# Patient Record
Sex: MALE | Race: WHITE | NOT HISPANIC OR LATINO | URBAN - METROPOLITAN AREA
[De-identification: names, ages, dates, MRNs, and addresses within clinical notes are randomized per-mention and may not be internally consistent; named-entity substitution may affect disease eponyms.]

---

## 2021-03-27 ENCOUNTER — IMMUNIZATION (OUTPATIENT)
Dept: NURSING | Facility: CLINIC | Age: 51
End: 2021-03-27
Payer: COMMERCIAL

## 2021-03-27 PROCEDURE — 91301 PR COVID VAC MODERNA 100 MCG/0.5 ML IM: CPT

## 2021-03-27 PROCEDURE — 0011A PR COVID VAC MODERNA 100 MCG/0.5 ML IM: CPT

## 2021-04-24 ENCOUNTER — IMMUNIZATION (OUTPATIENT)
Dept: NURSING | Facility: CLINIC | Age: 51
End: 2021-04-24
Attending: INTERNAL MEDICINE
Payer: COMMERCIAL

## 2021-04-24 PROCEDURE — 0012A PR COVID VAC MODERNA 100 MCG/0.5 ML IM: CPT

## 2021-04-24 PROCEDURE — 91301 PR COVID VAC MODERNA 100 MCG/0.5 ML IM: CPT

## 2021-05-01 ENCOUNTER — HEALTH MAINTENANCE LETTER (OUTPATIENT)
Age: 51
End: 2021-05-01

## 2021-10-11 ENCOUNTER — HEALTH MAINTENANCE LETTER (OUTPATIENT)
Age: 51
End: 2021-10-11

## 2021-11-12 ENCOUNTER — IMMUNIZATION (OUTPATIENT)
Dept: NURSING | Facility: CLINIC | Age: 51
End: 2021-11-12
Payer: COMMERCIAL

## 2021-11-12 PROCEDURE — 0064A COVID-19,PF,MODERNA (18+ YRS BOOSTER .25ML): CPT

## 2021-11-12 PROCEDURE — 91306 COVID-19,PF,MODERNA (18+ YRS BOOSTER .25ML): CPT

## 2022-05-22 ENCOUNTER — HEALTH MAINTENANCE LETTER (OUTPATIENT)
Age: 52
End: 2022-05-22

## 2022-09-25 ENCOUNTER — HEALTH MAINTENANCE LETTER (OUTPATIENT)
Age: 52
End: 2022-09-25

## 2023-05-26 ENCOUNTER — VIRTUAL VISIT (OUTPATIENT)
Dept: FAMILY MEDICINE | Facility: CLINIC | Age: 53
End: 2023-05-26
Payer: COMMERCIAL

## 2023-05-26 DIAGNOSIS — Z76.89 ENCOUNTER TO ESTABLISH CARE: ICD-10-CM

## 2023-05-26 DIAGNOSIS — J32.9 RECURRENT SINUSITIS: ICD-10-CM

## 2023-05-26 DIAGNOSIS — F33.1 MODERATE RECURRENT MAJOR DEPRESSION (H): Primary | ICD-10-CM

## 2023-05-26 PROCEDURE — 99203 OFFICE O/P NEW LOW 30 MIN: CPT | Mod: VID | Performed by: FAMILY MEDICINE

## 2023-05-26 RX ORDER — SERTRALINE HYDROCHLORIDE 25 MG/1
25 TABLET, FILM COATED ORAL DAILY
Qty: 90 TABLET | Refills: 3 | Status: SHIPPED | OUTPATIENT
Start: 2023-05-26 | End: 2024-03-25

## 2023-05-26 RX ORDER — SERTRALINE HYDROCHLORIDE 25 MG/1
TABLET, FILM COATED ORAL
COMMUNITY
Start: 2023-04-03 | End: 2023-05-26

## 2023-05-26 NOTE — PROGRESS NOTES
Assessment/Plan - Video Appointment    Establish care.    Major depression. Well controlled.  -continue sertraline 25 mg/d  -declines to restart behavioral therapy  -Kendrick wants to establish care w/ psychiatry as well. He worries that mood could decompensate and he wants to have relatively rapid access to psychiatry should this occur    Recurrent sinusitis. Well controlled.  -continue nasal irrigation  -continue seasonal loratadine    Orders Placed This Encounter   Procedures     Adult Mental Health  Referral     ----  Chief complaint: Depression    Social History     Social History Narrative    Updated 5/26/2023: Lives with wife and son (born around 2009). Works in IT security.     Transferring care from [facility], scheduling challenges there    Depression  Challenging childhood, family didn't share emotions much, still has strained relationship w/ dad  Formal diagnosis about 10 yrs ago    Worked w/ counselor    Tried sertraline, noted wgt gain and sexual dysfunction  Then tried fluoxetine  Then tried vilazodone, noted GI upset  Switched back to sertraline and is happy w/ how it's working    Hx of SI, but no recent SI  Denies hx of attempted self-harm    Denies hx of addiction    New job is less stressful    Also w/ recurrent sinusitis  1-2 episodes year in past, but fewer episodes since 2020  Uses loratadine seasonally  Tried Flonase, felt it caused nosebleeds    Exam  Affect normal    Patient verbally consented to telehealth visit.  Location of patient: home. Location of provider: office.  Start time of conversation: 7.04am. End time of conversation: 7.30am.  Billing based on complexity of encounter.

## 2023-05-30 PROBLEM — J32.9 RECURRENT SINUSITIS: Status: ACTIVE | Noted: 2023-05-30

## 2023-06-04 ENCOUNTER — HEALTH MAINTENANCE LETTER (OUTPATIENT)
Age: 53
End: 2023-06-04

## 2024-04-10 ENCOUNTER — OFFICE VISIT (OUTPATIENT)
Dept: ORTHOPEDICS | Facility: CLINIC | Age: 54
End: 2024-04-10
Payer: COMMERCIAL

## 2024-04-10 ENCOUNTER — ANCILLARY PROCEDURE (OUTPATIENT)
Dept: GENERAL RADIOLOGY | Facility: CLINIC | Age: 54
End: 2024-04-10
Attending: FAMILY MEDICINE
Payer: COMMERCIAL

## 2024-04-10 DIAGNOSIS — M25.561 ACUTE PAIN OF RIGHT KNEE: Primary | ICD-10-CM

## 2024-04-10 DIAGNOSIS — M25.561 ACUTE PAIN OF RIGHT KNEE: ICD-10-CM

## 2024-04-10 PROCEDURE — 99203 OFFICE O/P NEW LOW 30 MIN: CPT | Performed by: FAMILY MEDICINE

## 2024-04-10 PROCEDURE — 73562 X-RAY EXAM OF KNEE 3: CPT | Mod: TC | Performed by: RADIOLOGY

## 2024-04-10 NOTE — PATIENT INSTRUCTIONS
MCL Sprain    WHAT IS A MEDIAL COLLATERAL LIGAMENT INJURY?    A medial collateral ligament injury is an injury to one of the ligaments in your knee. Ligaments are strong bands of tissue that connect one bone to another to form the joints. The medial collateral ligament attaches your thighbone to your shinbone on the inside of your knee. When a ligament is injured, it can be stretched, partially torn, or completely torn. Complete tears make the knee joint very loose and unstable.    A ligament injury is also called a sprain.        WHAT IS THE CAUSE?    Medial collateral ligament injuries can happen if you are hit on the outer side of your knee or if a sudden activity twists or tears a ligament. It might happen, for example, if you fall and land on the side of your knee. It can also happen during skiing if you fall and catch the inside edge of your ski.    WHAT ARE THE SYMPTOMS?    Symptoms may include:    A loud, painful pop at the time of the injury  Pain, swelling, and tenderness on the inner side of your knee  A feeling that your knee is loose or unstable    HOW IS IT DIAGNOSED?    Your healthcare provider will ask about your symptoms, activities, and medical history and examine you. You may have X-rays or other scans, such as:    An ultrasound, which uses sound waves to show pictures of your knee joint  An MRI, which uses a strong magnetic field and radio waves to show detailed pictures of your knee joint    HOW IS IT TREATED?    You will need to change or stop doing the activities that cause pain until the ligament has healed.    Your healthcare provider may wrap an elastic bandage around your knee to keep the swelling from getting worse. You may need a knee immobilizer and crutches to protect your knee while you heal.    Complete tears of the medial collateral ligament rarely need surgery, but you may be in a knee immobilizer for several weeks.    Your healthcare provider may recommend stretching and  strengthening exercises to help you heal. You will need to keep doing rehabilitation exercises to keep your leg muscles strong if your ligament is loose after the injury.    The pain often gets better within a few weeks with self-care, but some injuries may take several months or longer to heal. It s important to follow all of your healthcare provider s instructions.    HOW CAN I TAKE CARE OF MYSELF?    To keep swelling down and help relieve pain for the first few days after the injury:    Put an ice pack, gel pack, or package of frozen vegetables wrapped in a cloth on the injured area every 3 to 4 hours for up to 20 minutes at a time.  Keep your knee up on a pillow when you sit or lie down.  Take nonprescription pain medicine, such as acetaminophen, ibuprofen, or naproxen. Nonsteroidal anti-inflammatory medicines (NSAIDs), such as ibuprofen and naproxen, may cause stomach bleeding and other problems. These risks increase with age. Read the label and take as directed. Unless recommended by your healthcare provider, you should not take this medicine for more than 10 days.  Follow your healthcare provider's instructions, including any exercises recommended by your provider. Ask your provider:    How and when you will hear your test results  How long it will take to recover  What activities you should avoid and when you can return to your normal activities  How to take care of yourself at home  What symptoms or problems you should watch for and what to do if you have them  Make sure you know when you should come back for a checkup.    HOW CAN I HELP PREVENT A MEDIAL COLLATERAL LIGAMENT INJURY?    Warm-up exercises and stretching before activities can help prevent injuries. For example, do exercises that build strong thigh and hamstring muscles and stretch your leg muscles.    Follow safety rules and use any protective equipment recommended for your work or sport. For example, if you ski, be sure your ski bindings are  set correctly by a trained professional so that they will release if you fall.      MCL Sprain Rehabilitation    You may do the first 6 exercises right away. You may do the remaining exercises when you have less pain and swelling in your knee.    Passive knee extension: Do this exercise if you are unable to extend your knee fully. While lying on your back, place a rolled-up towel under the heel of your injured leg so the heel is about 6 inches off the ground. Relax your leg muscles and let gravity slowly straighten your knee. Try to hold this position for 2 minutes. Repeat 3 times. You may feel some discomfort while doing this exercise. Do the exercise several times a day.  This exercise can also be done while sitting in a chair with your heel on another chair or stool.    Heel slide: Sit on a firm surface with your legs straight in front of you. Slowly slide the heel of the foot on your injured side toward your buttock by pulling your knee toward your chest as you slide the heel. Return to the starting position. Do 2 sets of 15.    Clam exercise: Lie on your uninjured side with your hips and knees bent and feet together. Slowly raise your top leg toward the ceiling while keeping your heels touching each other. Hold for 2 seconds and lower slowly. Do 2 sets of 15 repetitions.    Straight leg raise: Lie on your back with your legs straight out in front of you. Bend the knee on your uninjured side and place the foot flat on the floor. Tighten the thigh muscle on your injured side and lift your leg about 8 inches off the floor. Keep your leg straight and your thigh muscle tight. Slowly lower your leg back down to the floor. Do 2 sets of 15.    Side-lying leg lift: Lie on your uninjured side. Tighten the front thigh muscles on your injured leg and lift that leg 8 to 10 inches (20 to 25 centimeters) away from the other leg. Keep the leg straight and lower it slowly. Do 2 sets of 15.    Prone hip extension: Lie on your  stomach with your legs straight out behind you. Fold your arms under your head and rest your head on your arms. Draw your belly button in towards your spine and tighten your abdominal muscles. Tighten the buttocks and thigh muscles of the leg on your injured side and lift the leg off the floor about 8 inches. Keep your leg straight. Hold for 5 seconds. Then lower your leg and relax. Do 2 sets of 15.    Knee stabilization: Wrap a piece of elastic tubing around the ankle of your uninjured leg. Tie a knot in the other end of the tubing and close it in a door at about ankle height.  Stand facing the door on the leg without tubing (your injured leg) and bend your knee slightly, keeping your thigh muscles tight. Stay in this position while you move the leg with the tubing (the uninjured leg) straight back behind you. Do 2 sets of 15.  Turn 90 degrees so the leg without tubing is closest to the door. Move the leg with tubing away from your body. Do 2 sets of 15.  Turn 90 degrees again so your back is to the door. Move the leg with tubing straight out in front of you. Do 2 sets of 15.  Turn your body 90 degrees again so the leg with tubing is closest to the door. Move the leg with tubing across your body. Do 2 sets of 15.  Hold onto a chair if you need help balancing. This exercise can be made more challenging by standing on a firm pillow or foam mat while you move the leg with tubing.    Wall squat: Stand with your back, shoulders, and head against a wall and look straight ahead. Keep your shoulders relaxed and your feet about 3 feet (90 centimeters) away from the wall and a shoulder's width apart. Keeping your head against the wall, slide down the wall. Lower your buttocks toward the floor until your thighs are almost parallel to the floor. Hold this position for 10 seconds. Make sure to tighten your thigh muscles as you slowly slide back up to the starting position. Do 2 sets of 8 to 12. You can increase the amount of  time you are in the lower position to help strengthen your quadriceps muscles.    Step-up: Stand with the foot of your injured leg on a support 3 to 5 inches (8 to 13 centimeters) high --like a small step or block of wood. Keep your other foot flat on the floor. Shift your weight onto the injured leg on the support. Straighten your injured leg as the other leg comes off the floor. Return to the starting position by bending your injured leg and slowly lowering your uninjured leg back to the floor. Do 2 sets of 15.    Resisted terminal knee extension: Make a loop with a piece of elastic tubing by tying a knot in both ends. Close the knot in a door at knee height. Step into the loop with your injured leg so the tubing is around the back of your knee. Lift the other foot off the ground and hold onto a chair for balance, if needed. Bend the knee with tubing about 45 degrees. Slowly straighten your leg, keeping your thigh muscle tight as you do this. Repeat 15 times. Do 2 sets of 15. If you need an easier way to do this, stand on both legs for better support while you do the exercise.  If you have access to a wobble board, do the following exercises:    Wobble board exercises  Stand on a wobble board with your feet shoulder-width apart.    Rock the board forwards and backwards 30 times, then side to side 30 times. Hold on to a chair if you need support.    Rotate the wobble board around so that the edge of the board is in contact with the floor at all times. Do this 30 times in a clockwise and then a counterclockwise direction.  Balance on the wobble board for as long as you can without letting the edges touch the floor. Try to do this for 2 minutes without touching the floor.  Rotate the wobble board in clockwise and counterclockwise circles, but do not let the edge of the board touch the floor.    When you have mastered the wobble exercises standing on both legs, try repeating them while standing on just your injured  leg. After you are able to do these exercises on one leg, try to do them with your eyes closed. Make sure you have something nearby to support you in case you lose your balance.    Developed by [employer].  Published by [employer].  Copyright  2014 [employer] and/or one of its subsidiaries. All rights reserved.

## 2024-04-10 NOTE — PROGRESS NOTES
CHIEF COMPLAINT:  Pain of the Right Knee       HISTORY OF PRESENT ILLNESS  Mr. Cordova is a pleasant 54 year old year old male who presents to clinic today.  Tucker explains that right knee pain started about 2/19/2024 while snowboarding. Skidded around and noticed his right knee. It didn't stop him from boarding that day. Right leg is his back leg and is the leg that stays attached to the board. About a month later, around 3/19/2024, developed right knee pain while boarding and with walking that it stopped him from participated. Patient notes he was having trouble walking. Pain was worse with twisting. Since that time, knee pain has been improving/ calming down with topical diclofenac. Did still have episode of pain since that time.     Yesterday, hopped down on both legs and that caused pain to return.     Onset: sudden yesterday in the setting of more chronic component around February 19, 2023.  Location: Medial aspect of knee  Quality: Sharp at times, aching at times.  Duration: 1 day  Severity: 5/10 at worst  Timing:intermittent episodes   Modifying factors:  resting and non-use makes it better, movement and use makes it worse  Associated signs & symptoms: pain  Previous similar pain: No  Treatments to date: Relative rest, diclofenac.    Additional history: Denies any catching, locking or giving way at the knee especially since his exacerbation snowboarding in February.    Review of Systems:  A 10-point review of systems was obtained and is negative except for as noted in the HPI.     MEDICAL HISTORY  Patient Active Problem List   Diagnosis    Moderate recurrent major depression (H)    Recurrent sinusitis       Current Outpatient Medications   Medication Sig Dispense Refill    scopolamine (TRANSDERM) 1 MG/3DAYS 72 hr patch Place 1 patch onto the skin every 72 hours 4 patch 0    vilazodone (VIIBRYD) 10 MG TABS tablet Take 1 tablet (10 mg) by mouth daily 30 tablet 1       Not on File    Family History   Problem  Relation Age of Onset    Hypertension Mother     Hyperlipidemia Father        Additional medical/Social/Surgical histories reviewed in [facility] and updated as appropriate.       PHYSICAL EXAM  There were no vitals taken for this visit.    General  - normal appearance, in no obvious distress  Musculoskeletal - Right knee  - stance: normal gait without limp  - inspection: no swelling or effusion, normal bone and joint alignment, no obvious deformity  - palpation: no joint line tenderness, patella and patellar tendon non-tender, mild medial tenderness overlying the MCL.  - ROM: 135 degrees flexion, 0 degrees extension, not painful, normal actively and passively compared to contralateral  - strength: 5/5 in flexion, 5/5 in extension  - special tests:  (-) Lachman  (-) Orlin  (-) varus at 0 and 30 degrees flexion  (0) valgus at 0 and 30 degrees without laxity, however mild discomfort noted at 30 degrees with valgus stress.  Neuro  - no sensory or motor deficit, grossly normal coordination, normal muscle tone  Skin  - no ecchymosis, erythema, warmth, or induration, no obvious rash    IMAGING : X-ray right knee 3 views. Final results and radiologist's interpretation, available in the [facility] health record. Images were reviewed with the patient/family members in the office today. My personal interpretation of the performed imaging is no acute osseous abnormalities.  No degenerative changes.  No effusion.     ASSESSMENT & PLAN  Mr. Cordova is a 54 year old year old male who presents to clinic today with acute pain of right knee in the setting of subacute injury while snowboarding to months ago.    Acute injury occurred last afternoon after jumping down onto an area of his roof.    Concern at this time for possible low-grade MCL sprain versus meniscus tear.  He has no joint line tenderness today, negative Orlin's and has no mechanical symptoms, no laxity to warrant MRI.  X-ray unremarkable and absolutely no degenerative changes  noted.    Diagnosis: Acute pain of right knee    Further diagnostic measures including MRI as well as treatment measures discussed.  At this time I have recommended starting with a home exercise program and he does have a active PT order that he can consider pursuing over the next 4 to 6 weeks.  We discussed activity modifications and to allow the acute component of this injury to heal.  If he notes increasing pain with seemingly less provocative activity, would like to order an MRI of his right knee.  If he continues to improve, no need for follow-up and he can continue with his HEP at home.    It was a pleasure seeing Tucker today.    Nito Thomas DO, CAM  Primary Care Sports Medicine

## 2024-04-10 NOTE — LETTER
4/10/2024         RE: Tucker Cordova  [address] [address]        Dear Colleague,    Thank you for referring your patient, Tucker Cordova, to the [facility]. Please see a copy of my visit note below.    CHIEF COMPLAINT:  Pain of the Right Knee       HISTORY OF PRESENT ILLNESS  Mr. Cordova is a pleasant 54 year old year old male who presents to clinic today.  Tucker explains that right knee pain started about 2/19/2024 while snowboarding. Skidded around and noticed his right knee. It didn't stop him from boarding that day. Right leg is his back leg and is the leg that stays attached to the board. About a month later, around 3/19/2024, developed right knee pain while boarding and with walking that it stopped him from participated. Patient notes he was having trouble walking. Pain was worse with twisting. Since that time, knee pain has been improving/ calming down with topical diclofenac. Did still have episode of pain since that time.     Yesterday, hopped down on both legs and that caused pain to return.     Onset: sudden yesterday in the setting of more chronic component around February 19, 2023.  Location: Medial aspect of knee  Quality: Sharp at times, aching at times.  Duration: 1 day  Severity: 5/10 at worst  Timing:intermittent episodes   Modifying factors:  resting and non-use makes it better, movement and use makes it worse  Associated signs & symptoms: pain  Previous similar pain: No  Treatments to date: Relative rest, diclofenac.    Additional history: Denies any catching, locking or giving way at the knee especially since his exacerbation snowboarding in February.    Review of Systems:  A 10-point review of systems was obtained and is negative except for as noted in the HPI.     MEDICAL HISTORY  Patient Active Problem List   Diagnosis     Moderate recurrent major depression (H)     Recurrent sinusitis       Current Outpatient Medications    Medication Sig Dispense Refill     scopolamine (TRANSDERM) 1 MG/3DAYS 72 hr patch Place 1 patch onto the skin every 72 hours 4 patch 0     vilazodone (VIIBRYD) 10 MG TABS tablet Take 1 tablet (10 mg) by mouth daily 30 tablet 1       Not on File    Family History   Problem Relation Age of Onset     Hypertension Mother      Hyperlipidemia Father        Additional medical/Social/Surgical histories reviewed in [facility] and updated as appropriate.       PHYSICAL EXAM  There were no vitals taken for this visit.    General  - normal appearance, in no obvious distress  Musculoskeletal - Right knee  - stance: normal gait without limp  - inspection: no swelling or effusion, normal bone and joint alignment, no obvious deformity  - palpation: no joint line tenderness, patella and patellar tendon non-tender, mild medial tenderness overlying the MCL.  - ROM: 135 degrees flexion, 0 degrees extension, not painful, normal actively and passively compared to contralateral  - strength: 5/5 in flexion, 5/5 in extension  - special tests:  (-) Lachman  (-) Orlin  (-) varus at 0 and 30 degrees flexion  (0) valgus at 0 and 30 degrees without laxity, however mild discomfort noted at 30 degrees with valgus stress.  Neuro  - no sensory or motor deficit, grossly normal coordination, normal muscle tone  Skin  - no ecchymosis, erythema, warmth, or induration, no obvious rash    IMAGING : X-ray right knee 3 views. Final results and radiologist's interpretation, available in the [facility] health record. Images were reviewed with the patient/family members in the office today. My personal interpretation of the performed imaging is no acute osseous abnormalities.  No degenerative changes.  No effusion.     ASSESSMENT & PLAN  Mr. Cordova is a 54 year old year old male who presents to clinic today with acute pain of right knee in the setting of subacute injury while snowboarding to months ago.    Acute injury occurred last afternoon after jumping down onto  an area of his roof.    Concern at this time for possible low-grade MCL sprain versus meniscus tear.  He has no joint line tenderness today, negative Orlin's and has no mechanical symptoms, no laxity to warrant MRI.  X-ray unremarkable and absolutely no degenerative changes noted.    Diagnosis: Acute pain of right knee    Further diagnostic measures including MRI as well as treatment measures discussed.  At this time I have recommended starting with a home exercise program and he does have a active PT order that he can consider pursuing over the next 4 to 6 weeks.  We discussed activity modifications and to allow the acute component of this injury to heal.  If he notes increasing pain with seemingly less provocative activity, would like to order an MRI of his right knee.  If he continues to improve, no need for follow-up and he can continue with his HEP at home.    It was a pleasure seeing Tucker today.    Nito Thomas DO, [facility]  Primary Care Sports Medicine      Again, thank you for allowing me to participate in the care of your patient.        Sincerely,        Nito Thomas DO

## 2024-04-18 SDOH — HEALTH STABILITY: PHYSICAL HEALTH: ON AVERAGE, HOW MANY MINUTES DO YOU ENGAGE IN EXERCISE AT THIS LEVEL?: 20 MIN

## 2024-04-18 SDOH — HEALTH STABILITY: PHYSICAL HEALTH: ON AVERAGE, HOW MANY DAYS PER WEEK DO YOU ENGAGE IN MODERATE TO STRENUOUS EXERCISE (LIKE A BRISK WALK)?: 1 DAY

## 2024-04-18 ASSESSMENT — SOCIAL DETERMINANTS OF HEALTH (SDOH): HOW OFTEN DO YOU GET TOGETHER WITH FRIENDS OR RELATIVES?: TWICE A WEEK

## 2024-04-24 ASSESSMENT — PATIENT HEALTH QUESTIONNAIRE - PHQ9
SUM OF ALL RESPONSES TO PHQ QUESTIONS 1-9: 1
10. IF YOU CHECKED OFF ANY PROBLEMS, HOW DIFFICULT HAVE THESE PROBLEMS MADE IT FOR YOU TO DO YOUR WORK, TAKE CARE OF THINGS AT HOME, OR GET ALONG WITH OTHER PEOPLE: NOT DIFFICULT AT ALL
SUM OF ALL RESPONSES TO PHQ QUESTIONS 1-9: 1

## 2024-04-25 ENCOUNTER — ORDERS ONLY (AUTO-RELEASED) (OUTPATIENT)
Dept: FAMILY MEDICINE | Facility: CLINIC | Age: 54
End: 2024-04-25

## 2024-04-25 ENCOUNTER — OFFICE VISIT (OUTPATIENT)
Dept: FAMILY MEDICINE | Facility: CLINIC | Age: 54
End: 2024-04-25
Attending: FAMILY MEDICINE
Payer: COMMERCIAL

## 2024-04-25 VITALS
WEIGHT: 202 LBS | HEIGHT: 67 IN | OXYGEN SATURATION: 98 % | SYSTOLIC BLOOD PRESSURE: 126 MMHG | RESPIRATION RATE: 18 BRPM | HEART RATE: 73 BPM | BODY MASS INDEX: 31.71 KG/M2 | TEMPERATURE: 98 F | DIASTOLIC BLOOD PRESSURE: 76 MMHG

## 2024-04-25 DIAGNOSIS — Z13.220 LIPID SCREENING: ICD-10-CM

## 2024-04-25 DIAGNOSIS — Z00.00 VISIT FOR PREVENTIVE HEALTH EXAMINATION: Primary | ICD-10-CM

## 2024-04-25 DIAGNOSIS — Z12.11 SCREEN FOR COLON CANCER: ICD-10-CM

## 2024-04-25 DIAGNOSIS — F33.0 RECURRENT MAJOR DEPRESSIVE EPISODES, MILD (H): ICD-10-CM

## 2024-04-25 DIAGNOSIS — Z13.1 SCREENING FOR DIABETES MELLITUS: ICD-10-CM

## 2024-04-25 DIAGNOSIS — Z12.5 PROSTATE CANCER SCREENING: ICD-10-CM

## 2024-04-25 PROCEDURE — 99396 PREV VISIT EST AGE 40-64: CPT | Mod: 25 | Performed by: FAMILY MEDICINE

## 2024-04-25 PROCEDURE — 99214 OFFICE O/P EST MOD 30 MIN: CPT | Mod: 25 | Performed by: FAMILY MEDICINE

## 2024-04-25 PROCEDURE — 80061 LIPID PANEL: CPT | Performed by: FAMILY MEDICINE

## 2024-04-25 PROCEDURE — G0103 PSA SCREENING: HCPCS | Performed by: FAMILY MEDICINE

## 2024-04-25 PROCEDURE — 90471 IMMUNIZATION ADMIN: CPT | Performed by: FAMILY MEDICINE

## 2024-04-25 PROCEDURE — 36415 COLL VENOUS BLD VENIPUNCTURE: CPT | Performed by: FAMILY MEDICINE

## 2024-04-25 PROCEDURE — 82947 ASSAY GLUCOSE BLOOD QUANT: CPT | Performed by: FAMILY MEDICINE

## 2024-04-25 PROCEDURE — 90750 HZV VACC RECOMBINANT IM: CPT | Performed by: FAMILY MEDICINE

## 2024-04-25 RX ORDER — BUPROPION HYDROCHLORIDE 150 MG/1
150 TABLET ORAL EVERY MORNING
Qty: 30 TABLET | Refills: 2 | Status: SHIPPED | OUTPATIENT
Start: 2024-04-25 | End: 2024-06-05

## 2024-04-25 ASSESSMENT — PAIN SCALES - GENERAL: PAINLEVEL: NO PAIN (0)

## 2024-04-25 NOTE — PROGRESS NOTES
Preventive Care Visit  [facility]  Nito Haywood MD, Family Medicine  Apr 25, 2024  {Provider  Link to SmartSet :161500}    {PROVIDER CHARTING PREFERENCE:452656}    Miya Marks is a 54 year old, presenting for the following:  Physical        4/25/2024     8:50 AM   Additional Questions   Roomed by Jayesh COLLINS        Health Care Directive  Patient does not have a Health Care Directive or Living Will: {ADVANCE_DIRECTIVE_STATUS:519377}    HPI  ***  {MA/LPN/RN Pre-Provider Visit Orders- hCG/UA/Strep (Optional):579925}  {SUPERLIST (Optional):765897}  {additonal problems for provider to add (Optional):205122}      4/18/2024   General Health   How would you rate your overall physical health? Good   Feel stress (tense, anxious, or unable to sleep) Not at all         4/18/2024   Nutrition   Three or more servings of calcium each day? Yes   Diet: Regular (no restrictions)    Carbohydrate counting   How many servings of fruit and vegetables per day? (!) 0-1   How many sweetened beverages each day? 0-1         4/18/2024   Exercise   Days per week of moderate/strenous exercise 1 day   Average minutes spent exercising at this level 20 min   (!) EXERCISE CONCERN      4/18/2024   Social Factors   Frequency of gathering with friends or relatives Twice a week   Worry food won't last until get money to buy more No   Food not last or not have enough money for food? No   Do you have housing?  Yes   Are you worried about losing your housing? No   Lack of transportation? No   Unable to get utilities (heat,electricity)? No         4/18/2024   Fall Risk   Fallen 2 or more times in the past year? No   Trouble with walking or balance? No          4/18/2024   Dental   Dentist two times every year? Yes         4/18/2024   TB Screening   Were you born outside of the [place]? No       Today's PHQ-9 Score:       4/24/2024    12:29 PM   PHQ-9 SCORE   PHQ-9 Total Score MyChart 1 (Minimal depression)   PHQ-9  "Total Score 1         4/18/2024   Substance Use   Alcohol more than 3/day or more than 7/wk No   Do you use any other substances recreationally? (!) ALCOHOL     Social History     Tobacco Use    Smoking status: Never    Smokeless tobacco: Never   Vaping Use    Vaping status: Never Used   Substance Use Topics    Alcohol use: Yes     Alcohol/week: 10.0 standard drinks of alcohol     Types: 10 Standard drinks or equivalent per week    Drug use: Not Currently     {Provider  If there are gaps in the social history shown above, please follow the link to update and then refresh the note Link to Social and Substance History :601567}        4/18/2024   One time HIV Screening   Previous HIV test? Yes         4/18/2024   STI Screening   New sexual partner(s) since last STI/HIV test? No   ASCVD Risk   The ASCVD Risk score (Taj WARREN, et al., 2019) failed to calculate for the following reasons:    Cannot find a previous HDL lab    Cannot find a previous total cholesterol lab    {Link to Fracture Risk Assessment Tool (Optional):483098}    {Provider  Use the storyboard to review patient history, after sections have been marked as reviewed, refresh note to capture documentation:759251}   Reviewed and updated as needed this visit by Provider    Allergies  Meds  Problems    Fam Hx            {HISTORY OPTIONS (Optional):421037}    {ROS Picklists (Optional):087546}     Objective    Exam  /76 (BP Location: Right arm, Patient Position: Sitting, Cuff Size: Adult Regular)   Pulse 73   Temp 98  F (36.7  C) (Temporal)   Resp 18   Ht 1.7 m (5' 6.93\")   Wt 91.6 kg (202 lb)   SpO2 98%   BMI 31.70 kg/m     Estimated body mass index is 31.7 kg/m  as calculated from the following:    Height as of this encounter: 1.7 m (5' 6.93\").    Weight as of this encounter: 91.6 kg (202 lb).    Physical Exam  {Exam Choices (Optional):428084}        Signed Electronically by: Nito Haywood MD  {Email feedback regarding this note " to [email or web address]   :062215}  Answers submitted by the patient for this visit:  Patient Health Questionnaire (Submitted on 4/24/2024)  If you checked off any problems, how difficult have these problems made it for you to do your work, take care of things at home, or get along with other people?: Not difficult at all  PHQ9 TOTAL SCORE: 1

## 2024-04-25 NOTE — PROGRESS NOTES
"Assessment/Plan.    Preventive.  See below orders for screening tests and immunizations.  -Patient reports that he received usual childhood vaccines  -He is interested in hepatitis A and hepatitis B vaccines, but plans to get these on another date    Depression.  Given concerns with sexual dysfunction and weight gain with past medications, we discussed the option of switching to bupropion.  Will discontinue vilazodone and start bupropion 150 mg daily.  Patient would prefer not to titrate to 300 mg daily.  We reviewed the risk of restlessness, appetite suppression and insomnia.    Follow-up in 2 months for mood.    Orders Placed This Encounter   Procedures    ZOSTER RECOMBINANT ADJUVANTED (SHINGRIX)    Glucose    Lipid panel reflex to direct LDL Fasting    PSA, screen    COLOGUARD([employer])       ----  Chief complaint: Physical    Social History     Social History Narrative    -Grew up in [place]    -Lives with wife (has lupus) and son (born around 2009)    -Works in [employer]        Updated 4/25/2024     Patient has been advised of split billing: yes.    In March, patient sent message requesting medication change.  Sertraline was controlling mood symptoms, but he felt it was causing weight gain.  He was switched to vilazodone.  Patient worries this is causing decreased libido, which has led to strain with wife.    Previously met with therapist.  Previously tried buspirone, fluoxetine.    Denies history of seizure.    Denies STI concerns.    Exam  /76 (BP Location: Right arm, Patient Position: Sitting, Cuff Size: Adult Regular)   Pulse 73   Temp 98  F (36.7  C) (Temporal)   Resp 18   Ht 1.7 m (5' 6.93\")   Wt 91.6 kg (202 lb)   SpO2 98%   BMI 31.70 kg/m      oropharynx without abnormal masses  lung fields CTAB  heart with regular rate and rhythm, no murmurs  no lower leg edema  "

## 2024-04-26 LAB
CHOLEST SERPL-MCNC: 291 MG/DL
FASTING STATUS PATIENT QL REPORTED: ABNORMAL
FASTING STATUS PATIENT QL REPORTED: ABNORMAL
GLUCOSE SERPL-MCNC: 126 MG/DL (ref 70–99)
HDLC SERPL-MCNC: 41 MG/DL
LDLC SERPL CALC-MCNC: 198 MG/DL
NONHDLC SERPL-MCNC: 250 MG/DL
PSA SERPL DL<=0.01 NG/ML-MCNC: 0.65 NG/ML (ref 0–3.5)
TRIGL SERPL-MCNC: 258 MG/DL

## 2024-05-02 PROBLEM — R73.01 IFG (IMPAIRED FASTING GLUCOSE): Status: ACTIVE | Noted: 2024-05-02

## 2024-05-02 PROBLEM — E78.00 HIGH CHOLESTEROL: Status: ACTIVE | Noted: 2024-05-02

## 2024-05-08 LAB — NONINV COLON CA DNA+OCC BLD SCRN STL QL: NEGATIVE

## 2024-05-28 ENCOUNTER — LAB (OUTPATIENT)
Dept: LAB | Facility: CLINIC | Age: 54
End: 2024-05-28
Payer: COMMERCIAL

## 2024-05-28 DIAGNOSIS — Z13.1 SCREENING FOR DIABETES MELLITUS: ICD-10-CM

## 2024-05-28 DIAGNOSIS — Z13.220 LIPID SCREENING: ICD-10-CM

## 2024-05-28 LAB
CHOLEST SERPL-MCNC: 249 MG/DL
FASTING STATUS PATIENT QL REPORTED: YES
FASTING STATUS PATIENT QL REPORTED: YES
GLUCOSE SERPL-MCNC: 111 MG/DL (ref 70–99)
HBA1C MFR BLD: 6 % (ref 0–5.6)
HDLC SERPL-MCNC: 44 MG/DL
LDLC SERPL CALC-MCNC: 175 MG/DL
NONHDLC SERPL-MCNC: 205 MG/DL
TRIGL SERPL-MCNC: 149 MG/DL

## 2024-05-28 PROCEDURE — 36415 COLL VENOUS BLD VENIPUNCTURE: CPT

## 2024-05-28 PROCEDURE — 82947 ASSAY GLUCOSE BLOOD QUANT: CPT

## 2024-05-28 PROCEDURE — 80061 LIPID PANEL: CPT

## 2024-05-28 PROCEDURE — 83036 HEMOGLOBIN GLYCOSYLATED A1C: CPT

## 2024-05-28 SDOH — HEALTH STABILITY: PHYSICAL HEALTH: ON AVERAGE, HOW MANY DAYS PER WEEK DO YOU ENGAGE IN MODERATE TO STRENUOUS EXERCISE (LIKE A BRISK WALK)?: 2 DAYS

## 2024-05-28 SDOH — HEALTH STABILITY: PHYSICAL HEALTH: ON AVERAGE, HOW MANY MINUTES DO YOU ENGAGE IN EXERCISE AT THIS LEVEL?: 20 MIN

## 2024-05-28 ASSESSMENT — SOCIAL DETERMINANTS OF HEALTH (SDOH): HOW OFTEN DO YOU GET TOGETHER WITH FRIENDS OR RELATIVES?: ONCE A WEEK

## 2024-05-29 ENCOUNTER — OFFICE VISIT (OUTPATIENT)
Dept: FAMILY MEDICINE | Facility: CLINIC | Age: 54
End: 2024-05-29
Attending: FAMILY MEDICINE
Payer: COMMERCIAL

## 2024-05-29 VITALS
HEART RATE: 72 BPM | DIASTOLIC BLOOD PRESSURE: 98 MMHG | OXYGEN SATURATION: 97 % | TEMPERATURE: 97.4 F | HEIGHT: 71 IN | SYSTOLIC BLOOD PRESSURE: 137 MMHG | RESPIRATION RATE: 18 BRPM | BODY MASS INDEX: 27.86 KG/M2 | WEIGHT: 199 LBS

## 2024-05-29 DIAGNOSIS — R03.0 ELEVATED BP WITHOUT DIAGNOSIS OF HYPERTENSION: Primary | ICD-10-CM

## 2024-05-29 DIAGNOSIS — F33.1 MODERATE EPISODE OF RECURRENT MAJOR DEPRESSIVE DISORDER (H): ICD-10-CM

## 2024-05-29 DIAGNOSIS — E78.00 HIGH CHOLESTEROL: ICD-10-CM

## 2024-05-29 DIAGNOSIS — Z13.0 SCREENING FOR DEFICIENCY ANEMIA: ICD-10-CM

## 2024-05-29 DIAGNOSIS — R73.03 PREDIABETES: ICD-10-CM

## 2024-05-29 PROCEDURE — 99214 OFFICE O/P EST MOD 30 MIN: CPT | Performed by: FAMILY MEDICINE

## 2024-05-29 RX ORDER — FLUTICASONE PROPIONATE 50 MCG
1 SPRAY, SUSPENSION (ML) NASAL DAILY
COMMUNITY

## 2024-05-29 ASSESSMENT — PAIN SCALES - GENERAL: PAINLEVEL: NO PAIN (0)

## 2024-05-29 NOTE — PROGRESS NOTES
"Assessment/Plan.    Elevated blood pressure.  Start home monitoring.    Prediabetes.  New diagnosis.  Discussed carb restriction, exercise.    High cholesterol.  AHA 10-year CV risk score 7.7%.  Discussed exercise, healthy eating.  Patient will consider starting statin.    Depression.  Responding well to bupropion.  Patient declines dose increase.  Continue bupropion 150 mg daily.    Next visit in 6 months for prediabetes.      ----  Chief complaint: Physical    Social History     Social History Narrative    -Grew up in [place]    -Lives with wife (has lupus) and son (born around 2009)    -Works in IT security        Updated 4/25/2024     About 1 month ago, discontinued vilazodone and initiated bupropion.  Mood good overall.  Denies palpitations or insomnia.  Unfortunately, libido has not improved.    Appetite down.  Has lost a few pounds.  Goal weight 180 pounds.  Weight around 165 pounds when graduating high school.    Glucose elevated at last visit.  Restricting sugar intake.    Exam  BP (!) 137/98   Pulse 72   Temp 97.4  F (36.3  C) (Temporal)   Resp 18   Ht 1.803 m (5' 11\")   Wt 90.3 kg (199 lb)   SpO2 97%   BMI 27.75 kg/m    "

## 2024-05-29 NOTE — PROGRESS NOTES
Preventive Care Visit  [facility]  Nito Haywood MD, Family Medicine  May 29, 2024  {Provider  Link to SmartSet :761181}    {PROVIDER CHARTING PREFERENCE:564475}    Miya Marks is a 54 year old, presenting for the following:  Physical        5/29/2024     8:27 AM   Additional Questions   Roomed by emily Cid MA        Health Care Directive  Patient does not have a Health Care Directive or Living Will: {ADVANCE_DIRECTIVE_STATUS:880561}    HPI  ***  {MA/LPN/RN Pre-Provider Visit Orders- hCG/UA/Strep (Optional):900393}  {SUPERLIST (Optional):188582}  {additonal problems for provider to add (Optional):526100}      5/28/2024   General Health   How would you rate your overall physical health? Good   Feel stress (tense, anxious, or unable to sleep) Only a little   (!) STRESS CONCERN      5/28/2024   Nutrition   Three or more servings of calcium each day? Yes   Diet: Regular (no restrictions)   How many servings of fruit and vegetables per day? (!) 2-3   How many sweetened beverages each day? 0-1         5/28/2024   Exercise   Days per week of moderate/strenous exercise 2 days   Average minutes spent exercising at this level 20 min   (!) EXERCISE CONCERN      5/28/2024   Social Factors   Frequency of gathering with friends or relatives Once a week   Worry food won't last until get money to buy more No   Food not last or not have enough money for food? No   Do you have housing?  Yes   Are you worried about losing your housing? No   Lack of transportation? No   Unable to get utilities (heat,electricity)? No         5/28/2024   Fall Risk   Fallen 2 or more times in the past year? No   Trouble with walking or balance? No          5/28/2024   Dental   Dentist two times every year? Yes         4/18/2024   TB Screening   Were you born outside of the [place]? No       { Rooming Staff Patient needs a PHQ as part of the AWV.  Use this link to complete and then refresh the note to pull  "results Link to PHQ9 Assessment :728683}  {USE TO PULL IN PHQ RESULTS FOR TODAY:609832}        5/28/2024   Substance Use   Alcohol more than 3/day or more than 7/wk No   Do you use any other substances recreationally? (!) ALCOHOL     Social History     Tobacco Use    Smoking status: Never     Passive exposure: Never    Smokeless tobacco: Never   Vaping Use    Vaping status: Never Used   Substance Use Topics    Alcohol use: Yes     Alcohol/week: 10.0 standard drinks of alcohol     Types: 10 Standard drinks or equivalent per week    Drug use: Not Currently     {Provider  If there are gaps in the social history shown above, please follow the link to update and then refresh the note Link to Social and Substance History :446070}      5/28/2024   STI Screening   New sexual partner(s) since last STI/HIV test? No   ASCVD Risk   The 10-year ASCVD risk score (Taj WARREN, et al., 2019) is: 8.2%    Values used to calculate the score:      Age: 54 years      Sex: Male      Is Non- : No      Diabetic: No      Tobacco smoker: No      Systolic Blood Pressure: 142 mmHg      Is BP treated: No      HDL Cholesterol: 44 mg/dL      Total Cholesterol: 249 mg/dL    {Link to Fracture Risk Assessment Tool (Optional):095077}    {Provider  Use the storyboard to review patient history, after sections have been marked as reviewed, refresh note to capture documentation:399279}   Reviewed and updated as needed this visit by Provider    Allergies  Meds  Problems               {HISTORY OPTIONS (Optional):858013}    {ROS Picklists (Optional):134638}     Objective    Exam  BP (!) 142/90 (BP Location: Right arm, Patient Position: Sitting, Cuff Size: Adult Regular)   Pulse 72   Temp 97.4  F (36.3  C) (Temporal)   Resp 18   Ht 1.803 m (5' 11\")   Wt 90.3 kg (199 lb)   SpO2 97%   BMI 27.75 kg/m     Estimated body mass index is 27.75 kg/m  as calculated from the following:    Height as of this encounter: 1.803 m " "(5' 11\").    Weight as of this encounter: 90.3 kg (199 lb).    Physical Exam  {Exam Choices (Optional):112488}        Signed Electronically by: Nito Haywood MD  {Email feedback regarding this note to [email or web address]   :269989}  "

## 2024-05-29 NOTE — PATIENT INSTRUCTIONS
Blood pressure can change throughout the day. It can be affected by many factors, including:  -sleep, exercise, anxiety  -caffeine, tobacco, other drug use    Check your blood pressure at home once a day.    Use a monitor that fits on your upper arm. The American Medical Association has a list of home monitors that have passed an assessment for accuracy. The list can be found here: https://www.validatebp.org/.    To get an accurate reading, rest for at least 5 minutes before you check. Each time you check, obtain 2 sets of readings from the machine. Wait at least 2 minutes between the first reading and second reading. Write down both sets of numbers on a piece of paper or in your phone.    I will send you a [employer] message in 3 weeks asking for an update regarding your home blood pressure readings.      Think about statin.  I recommend checking out [facility] Statin Decision Aid

## 2024-06-03 DIAGNOSIS — F33.0 RECURRENT MAJOR DEPRESSIVE EPISODES, MILD (H): ICD-10-CM

## 2024-06-03 RX ORDER — BUPROPION HYDROCHLORIDE 150 MG/1
150 TABLET ORAL EVERY MORNING
Qty: 30 TABLET | Refills: 2 | OUTPATIENT
Start: 2024-06-03

## 2024-06-06 RX ORDER — BUPROPION HYDROCHLORIDE 150 MG/1
150 TABLET ORAL EVERY MORNING
Qty: 90 TABLET | Refills: 1 | Status: SHIPPED | OUTPATIENT
Start: 2024-06-06 | End: 2024-07-08

## 2024-06-22 PROBLEM — R03.0 ELEVATED BP WITHOUT DIAGNOSIS OF HYPERTENSION: Status: ACTIVE | Noted: 2024-06-22

## 2025-03-27 ENCOUNTER — LAB (OUTPATIENT)
Dept: LAB | Facility: CLINIC | Age: 55
End: 2025-03-27
Payer: COMMERCIAL

## 2025-03-27 DIAGNOSIS — Z13.1 SCREENING FOR DIABETES MELLITUS: ICD-10-CM

## 2025-03-27 DIAGNOSIS — R73.03 PREDIABETES: ICD-10-CM

## 2025-03-27 DIAGNOSIS — Z13.220 LIPID SCREENING: ICD-10-CM

## 2025-03-27 DIAGNOSIS — Z13.0 SCREENING FOR DEFICIENCY ANEMIA: ICD-10-CM

## 2025-03-27 LAB
ANION GAP SERPL CALCULATED.3IONS-SCNC: 10 MMOL/L (ref 7–15)
BUN SERPL-MCNC: 18 MG/DL (ref 6–20)
CALCIUM SERPL-MCNC: 9.5 MG/DL (ref 8.8–10.4)
CHLORIDE SERPL-SCNC: 104 MMOL/L (ref 98–107)
CHOLEST SERPL-MCNC: 198 MG/DL
CREAT SERPL-MCNC: 0.95 MG/DL (ref 0.67–1.17)
EGFRCR SERPLBLD CKD-EPI 2021: >90 ML/MIN/1.73M2
ERYTHROCYTE [DISTWIDTH] IN BLOOD BY AUTOMATED COUNT: 11.7 % (ref 10–15)
EST. AVERAGE GLUCOSE BLD GHB EST-MCNC: 117 MG/DL
FASTING STATUS PATIENT QL REPORTED: YES
FASTING STATUS PATIENT QL REPORTED: YES
GLUCOSE SERPL-MCNC: 119 MG/DL (ref 70–99)
HBA1C MFR BLD: 5.7 % (ref 0–5.6)
HCO3 SERPL-SCNC: 27 MMOL/L (ref 22–29)
HCT VFR BLD AUTO: 40.7 % (ref 40–53)
HDLC SERPL-MCNC: 46 MG/DL
HGB BLD-MCNC: 14.1 G/DL (ref 13.3–17.7)
LDLC SERPL CALC-MCNC: 117 MG/DL
MCH RBC QN AUTO: 30.5 PG (ref 26.5–33)
MCHC RBC AUTO-ENTMCNC: 34.6 G/DL (ref 31.5–36.5)
MCV RBC AUTO: 88 FL (ref 78–100)
NONHDLC SERPL-MCNC: 152 MG/DL
PLATELET # BLD AUTO: 191 10E3/UL (ref 150–450)
POTASSIUM SERPL-SCNC: 4.1 MMOL/L (ref 3.4–5.3)
RBC # BLD AUTO: 4.63 10E6/UL (ref 4.4–5.9)
SODIUM SERPL-SCNC: 141 MMOL/L (ref 135–145)
TRIGL SERPL-MCNC: 175 MG/DL
WBC # BLD AUTO: 6.4 10E3/UL (ref 4–11)

## 2025-04-16 SDOH — HEALTH STABILITY: PHYSICAL HEALTH: ON AVERAGE, HOW MANY MINUTES DO YOU ENGAGE IN EXERCISE AT THIS LEVEL?: 20 MIN

## 2025-04-16 SDOH — HEALTH STABILITY: PHYSICAL HEALTH: ON AVERAGE, HOW MANY DAYS PER WEEK DO YOU ENGAGE IN MODERATE TO STRENUOUS EXERCISE (LIKE A BRISK WALK)?: 2 DAYS

## 2025-04-16 ASSESSMENT — SOCIAL DETERMINANTS OF HEALTH (SDOH): HOW OFTEN DO YOU GET TOGETHER WITH FRIENDS OR RELATIVES?: ONCE A WEEK

## 2025-04-16 ASSESSMENT — PATIENT HEALTH QUESTIONNAIRE - PHQ9: SUM OF ALL RESPONSES TO PHQ QUESTIONS 1-9: 0

## 2025-04-17 ENCOUNTER — OFFICE VISIT (OUTPATIENT)
Dept: FAMILY MEDICINE | Facility: CLINIC | Age: 55
End: 2025-04-17
Payer: COMMERCIAL

## 2025-04-17 VITALS
RESPIRATION RATE: 12 BRPM | BODY MASS INDEX: 26.32 KG/M2 | SYSTOLIC BLOOD PRESSURE: 155 MMHG | OXYGEN SATURATION: 98 % | DIASTOLIC BLOOD PRESSURE: 99 MMHG | WEIGHT: 188 LBS | TEMPERATURE: 98 F | HEART RATE: 76 BPM | HEIGHT: 71 IN

## 2025-04-17 DIAGNOSIS — I10 PRIMARY HYPERTENSION: ICD-10-CM

## 2025-04-17 DIAGNOSIS — F33.41 RECURRENT MAJOR DEPRESSIVE DISORDER, IN PARTIAL REMISSION: ICD-10-CM

## 2025-04-17 DIAGNOSIS — Z00.00 VISIT FOR PREVENTIVE HEALTH EXAMINATION: Primary | ICD-10-CM

## 2025-04-17 DIAGNOSIS — R73.03 PREDIABETES: ICD-10-CM

## 2025-04-17 DIAGNOSIS — E78.5 DYSLIPIDEMIA: ICD-10-CM

## 2025-04-17 PROBLEM — J32.9 RECURRENT SINUSITIS: Status: ACTIVE | Noted: 2023-05-30

## 2025-04-17 PROBLEM — R03.0 ELEVATED BP WITHOUT DIAGNOSIS OF HYPERTENSION: Status: RESOLVED | Noted: 2024-06-22 | Resolved: 2025-04-17

## 2025-04-17 PROCEDURE — 3080F DIAST BP >= 90 MM HG: CPT | Performed by: FAMILY MEDICINE

## 2025-04-17 PROCEDURE — 99214 OFFICE O/P EST MOD 30 MIN: CPT | Mod: 25 | Performed by: FAMILY MEDICINE

## 2025-04-17 PROCEDURE — 3077F SYST BP >= 140 MM HG: CPT | Performed by: FAMILY MEDICINE

## 2025-04-17 PROCEDURE — 96127 BRIEF EMOTIONAL/BEHAV ASSMT: CPT | Performed by: FAMILY MEDICINE

## 2025-04-17 PROCEDURE — 99396 PREV VISIT EST AGE 40-64: CPT | Mod: 25 | Performed by: FAMILY MEDICINE

## 2025-04-17 PROCEDURE — 90471 IMMUNIZATION ADMIN: CPT | Performed by: FAMILY MEDICINE

## 2025-04-17 PROCEDURE — 90677 PCV20 VACCINE IM: CPT | Performed by: FAMILY MEDICINE

## 2025-04-17 RX ORDER — AMLODIPINE BESYLATE 5 MG/1
5 TABLET ORAL DAILY
Qty: 90 TABLET | Refills: 3 | Status: SHIPPED | OUTPATIENT
Start: 2025-04-17

## 2025-04-17 RX ORDER — ACYCLOVIR 400 MG/1
TABLET ORAL
Qty: 3 EACH | Refills: 5 | Status: SHIPPED | OUTPATIENT
Start: 2025-04-17

## 2025-04-17 RX ORDER — METFORMIN HYDROCHLORIDE 500 MG/1
TABLET, EXTENDED RELEASE ORAL
Qty: 60 TABLET | Refills: 2 | Status: SHIPPED | OUTPATIENT
Start: 2025-04-17 | End: 2025-05-17

## 2025-04-17 SDOH — HEALTH STABILITY: PHYSICAL HEALTH: ON AVERAGE, HOW MANY MINUTES DO YOU ENGAGE IN EXERCISE AT THIS LEVEL?: 20 MIN

## 2025-04-17 SDOH — HEALTH STABILITY: PHYSICAL HEALTH: ON AVERAGE, HOW MANY DAYS PER WEEK DO YOU ENGAGE IN MODERATE TO STRENUOUS EXERCISE (LIKE A BRISK WALK)?: 2 DAYS

## 2025-04-17 ASSESSMENT — PATIENT HEALTH QUESTIONNAIRE - PHQ9: SUM OF ALL RESPONSES TO PHQ QUESTIONS 1-9: 0

## 2025-04-17 ASSESSMENT — SOCIAL DETERMINANTS OF HEALTH (SDOH): HOW OFTEN DO YOU GET TOGETHER WITH FRIENDS OR RELATIVES?: ONCE A WEEK

## 2025-04-17 NOTE — PROGRESS NOTES
"Assessment/Plan    Problem   Visit for Preventive Health Examination    Standard childhood vaccines - reports receiving outside [place]  STI screen - declines  Contraception - wife post-menopausal  Twinrix - plans to start at pharmacy  PSA - father w/ prostate cancer, PSA wnl in 2024, we agreed to recheck in 2026  See below for additional preventive orders     Hypertension    4/17/2025  Recent home BP readings: SBP mostly below 130, DBP mostly in 130s. Kendrick feels that BP has increased 10 pts since he started desvenlafaxine.  -Kendrick plans to exercise more  -start amlodipine 5 mg/d     Dyslipidemia    -atorvastatin - started in 2024 4/17/2025  Recent LDL improved, but still above goal (<100).  -will message Kendrick about statin dose increase     Prediabetes    4/17/2025  Has used Stelo 3 times. Seems to match most recent A1C. Trying to restrict carbs. Switched morning intake to mostly protein.  -carb restriction  -exercise  -start CGM. Will see if insurance covers  -start metformin XR. Titrate to 1000 mg/d. Reviewed risk of diarrhea        Depression    -challenging childhood, family didn't share emotions much, still has strained relationship w/ dad  -formal diagnosis around 2013  -vilazodone - GI upset, decreased libido  -sertraline - weight gain  -fluoxetine - tried it  -desvenlafaxine - started 7/2024 4/17/2025  Mood good.  -desvenlafaxine 50 mg/d       Orders Placed This Encounter   Procedures    Pneumococcal 20 Valent Conjugate (PCV20)     Next visit in 2-3 months for prediabetes, HTN.      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    Exam  BP (!) 155/99 (BP Location: Right arm, Patient Position: Sitting, Cuff Size: Adult Regular)   Pulse 76   Temp 98  F (36.7  C) (Temporal)   Resp 12   Ht 1.791 m (5' 10.5\")   Wt 85.3 kg " (188 lb)   SpO2 98%   BMI 26.59 kg/m      Wt Readings from Last 2 Encounters:   04/17/25 85.3 kg (188 lb)   05/29/24 90.3 kg (199 lb)     oropharynx without abnormal masses  No thyromegaly  lung fields CTAB  heart with regular rate and rhythm, no murmurs  no lower leg edema

## 2025-04-18 ENCOUNTER — TELEPHONE (OUTPATIENT)
Dept: FAMILY MEDICINE | Facility: CLINIC | Age: 55
End: 2025-04-18
Payer: COMMERCIAL

## 2025-04-22 NOTE — TELEPHONE ENCOUNTER
PA Initiation    Medication: DEXCOM G7 SENSOR MISC  Insurance Company: [employer] Non-Specialty PA's - Phone [phone] Fax [phone]  Pharmacy Filling the Rx: [id number] - [place], [place] - [address]  Filling Pharmacy Phone: [phone]  Filling Pharmacy Fax:    Start Date: 4/22/2025  Retail Pharmacy Prior Authorization Team   Phone: [phone]

## 2025-04-29 NOTE — TELEPHONE ENCOUNTER
PRIOR AUTHORIZATION DENIED    Medication: DEXCOM G7 SENSOR MISC  Insurance Company: [employer] Non-Specialty PA's - Phone [phone] Fax [phone]  Denial Date: 4/28/2025  Denial Reason(s):     Appeal Information:     Patient Notified: The clinic should notify the patient of the denial and discuss possible change in medication.

## 2025-05-06 DIAGNOSIS — R73.03 PREDIABETES: Primary | ICD-10-CM

## 2025-05-06 RX ORDER — ACYCLOVIR 400 MG/1
TABLET ORAL
Qty: 3 EACH | Refills: 5 | Status: SHIPPED | OUTPATIENT
Start: 2025-05-06

## 2025-05-20 DIAGNOSIS — R73.03 PREDIABETES: ICD-10-CM

## 2025-05-20 RX ORDER — METFORMIN HYDROCHLORIDE 500 MG/1
TABLET, EXTENDED RELEASE ORAL
Qty: 60 TABLET | Refills: 2 | OUTPATIENT
Start: 2025-05-20 | End: 2025-06-19

## 2025-06-25 ENCOUNTER — MYC REFILL (OUTPATIENT)
Dept: FAMILY MEDICINE | Facility: CLINIC | Age: 55
End: 2025-06-25
Payer: COMMERCIAL

## 2025-06-25 DIAGNOSIS — E78.00 HIGH CHOLESTEROL: ICD-10-CM

## 2025-06-25 DIAGNOSIS — R73.03 PREDIABETES: ICD-10-CM

## 2025-06-25 RX ORDER — METFORMIN HYDROCHLORIDE 500 MG/1
2000 TABLET, EXTENDED RELEASE ORAL
Qty: 120 TABLET | Refills: 1 | Status: SHIPPED | OUTPATIENT
Start: 2025-06-25

## 2025-06-25 RX ORDER — ATORVASTATIN CALCIUM 10 MG/1
10 TABLET, FILM COATED ORAL DAILY
Qty: 90 TABLET | Refills: 3 | Status: CANCELLED | OUTPATIENT
Start: 2025-06-25

## 2025-06-26 RX ORDER — ATORVASTATIN CALCIUM 20 MG/1
20 TABLET, FILM COATED ORAL DAILY
Qty: 90 TABLET | Refills: 2 | Status: SHIPPED | OUTPATIENT
Start: 2025-06-26 | End: 2025-09-24

## 2025-07-11 ASSESSMENT — ANXIETY QUESTIONNAIRES
GAD7 TOTAL SCORE: 0
8. IF YOU CHECKED OFF ANY PROBLEMS, HOW DIFFICULT HAVE THESE MADE IT FOR YOU TO DO YOUR WORK, TAKE CARE OF THINGS AT HOME, OR GET ALONG WITH OTHER PEOPLE?: NOT DIFFICULT AT ALL
4. TROUBLE RELAXING: NOT AT ALL
7. FEELING AFRAID AS IF SOMETHING AWFUL MIGHT HAPPEN: NOT AT ALL
1. FEELING NERVOUS, ANXIOUS, OR ON EDGE: NOT AT ALL
GAD7 TOTAL SCORE: 0
7. FEELING AFRAID AS IF SOMETHING AWFUL MIGHT HAPPEN: NOT AT ALL
6. BECOMING EASILY ANNOYED OR IRRITABLE: NOT AT ALL
2. NOT BEING ABLE TO STOP OR CONTROL WORRYING: NOT AT ALL
3. WORRYING TOO MUCH ABOUT DIFFERENT THINGS: NOT AT ALL
IF YOU CHECKED OFF ANY PROBLEMS ON THIS QUESTIONNAIRE, HOW DIFFICULT HAVE THESE PROBLEMS MADE IT FOR YOU TO DO YOUR WORK, TAKE CARE OF THINGS AT HOME, OR GET ALONG WITH OTHER PEOPLE: NOT DIFFICULT AT ALL
5. BEING SO RESTLESS THAT IT IS HARD TO SIT STILL: NOT AT ALL
GAD7 TOTAL SCORE: 0

## 2025-07-16 ENCOUNTER — VIRTUAL VISIT (OUTPATIENT)
Dept: FAMILY MEDICINE | Facility: CLINIC | Age: 55
End: 2025-07-16
Payer: COMMERCIAL

## 2025-07-16 DIAGNOSIS — R73.03 PREDIABETES: Primary | ICD-10-CM

## 2025-07-16 DIAGNOSIS — Z13.1 SCREENING FOR DIABETES MELLITUS: ICD-10-CM

## 2025-07-16 DIAGNOSIS — I10 PRIMARY HYPERTENSION: ICD-10-CM

## 2025-07-16 PROCEDURE — 98005 SYNCH AUDIO-VIDEO EST LOW 20: CPT | Performed by: FAMILY MEDICINE

## 2025-07-16 NOTE — PROGRESS NOTES
Assessment/Plan    Social.  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  House renovation is almost complete.    Hypertension.  Started amlodipine a few months ago.  Recent home blood pressure readings averaging 114/75.  Denies recent dizziness.  Continue amlodipine with no dose change.    Prediabetes.  Started metformin a few months ago.  Taking 2 g each morning.  Denies GI upset.  Dexcom was not covered, but patient has used Stelo occasionally.  Recent fasting glucose 77.  Recent weight 176.5 pounds.  Patient will continue to work on exercise and healthy eating.  Will continue metformin with no dose change.    Next visit in 6-9 months for preventative.    Orders Placed This Encounter   Procedures    Extra SST Tube (LAB USE ONLY)    Hemoglobin A1c       ----  Chief complaint: Recheck Medication    Social History     Social History Narrative    -Grew up in [place]    -Lives with wife (has lupus) and son (born around 2009)    -Works in [employer]     Exam  There were no vitals taken for this visit.    Wt Readings from Last 2 Encounters:   04/17/25 85.3 kg (188 lb)   05/29/24 90.3 kg (199 lb)     Affect normal    Telehealth platform: Zur[employer]. Reason for audio-only visit: N/A.  Location of patient: home. Location of provider: office.  Start time of conversation: 9:09 AM. End time of conversation: 9:31 AM.